# Patient Record
Sex: MALE | Race: WHITE | Employment: OTHER | URBAN - METROPOLITAN AREA
[De-identification: names, ages, dates, MRNs, and addresses within clinical notes are randomized per-mention and may not be internally consistent; named-entity substitution may affect disease eponyms.]

---

## 2019-08-12 ENCOUNTER — HOSPITAL ENCOUNTER (OUTPATIENT)
Dept: RADIATION ONCOLOGY | Age: 74
Discharge: HOME | End: 2019-08-12
Payer: MEDICARE

## 2019-08-12 PROCEDURE — 77290 THER RAD SIMULAJ FIELD CPLX: CPT | Performed by: RADIOLOGY

## 2019-08-12 PROCEDURE — G6012 RADIATION TREATMENT DELIVERY: HCPCS | Performed by: RADIOLOGY

## 2019-08-12 PROCEDURE — 77321 SPECIAL TELETX PORT PLAN: CPT | Performed by: RADIOLOGY

## 2019-08-12 PROCEDURE — 99999 PR OFFICE/OUTPT VISIT,PROCEDURE ONLY: CPT | Performed by: RADIOLOGY

## 2019-08-12 PROCEDURE — 77334 RADIATION TREATMENT AID(S): CPT | Performed by: RADIOLOGY

## 2019-08-14 ENCOUNTER — HOSPITAL ENCOUNTER (OUTPATIENT)
Dept: RADIATION ONCOLOGY | Age: 74
Discharge: HOME | End: 2019-08-14
Attending: RADIOLOGY
Payer: MEDICARE

## 2019-08-14 PROCEDURE — G6012 RADIATION TREATMENT DELIVERY: HCPCS | Performed by: RADIOLOGY

## 2019-08-16 ENCOUNTER — HOSPITAL ENCOUNTER (OUTPATIENT)
Dept: RADIATION ONCOLOGY | Age: 74
Discharge: HOME | End: 2019-08-16
Attending: RADIOLOGY
Payer: MEDICARE

## 2019-08-16 ENCOUNTER — HOSPITAL ENCOUNTER (OUTPATIENT)
Dept: RADIATION ONCOLOGY | Age: 74
Discharge: HOME | End: 2019-08-16
Payer: MEDICARE

## 2019-08-16 PROCEDURE — G6012 RADIATION TREATMENT DELIVERY: HCPCS | Performed by: RADIOLOGY

## 2019-08-16 PROCEDURE — 77336 RADIATION PHYSICS CONSULT: CPT | Performed by: RADIOLOGY

## 2019-09-19 ENCOUNTER — HOSPITAL ENCOUNTER (OUTPATIENT)
Dept: RADIATION ONCOLOGY | Age: 74
Discharge: HOME | End: 2019-09-19
Payer: MEDICARE

## 2019-09-19 VITALS
BODY MASS INDEX: 25.9 KG/M2 | HEART RATE: 52 BPM | RESPIRATION RATE: 16 BRPM | SYSTOLIC BLOOD PRESSURE: 111 MMHG | OXYGEN SATURATION: 97 % | DIASTOLIC BLOOD PRESSURE: 56 MMHG | HEIGHT: 71 IN | WEIGHT: 185 LBS | TEMPERATURE: 97.7 F

## 2019-09-19 ASSESSMENT — PAIN SCALES - GENERAL: PAINLEVEL_OUTOF10: 0

## 2019-09-19 NOTE — PROGRESS NOTES
Weekly Radiation Treatment Progress Note    DATE OF SERVICE: 9/19/2019     Pt doing well. Energy good. Here for 1mo F/up after RT for Keloid. Mild skin itching/soreness- controlled with vasoline intensive care. EXAM  Wt Readings from Last 3 Encounters:   09/19/19 185 lb (83.9 kg)     Vitals:    09/19/19 1046   BP: (!) 111/56   Pulse: 52   Resp: 16   Temp: 97.7 °F (36.5 °C)   SpO2: 97%     NAD  Mild Skin erythema. No desquamation. Mild residual inflammation    Setup images, chart, plan reviewed    A/P:   Recovering well. Still with some inflammation.  Add 1% HC cream  RV prn    Electronically signed by Manuela Hager MD on 9/19/2019 at 11:17 AM